# Patient Record
Sex: FEMALE | Race: WHITE
[De-identification: names, ages, dates, MRNs, and addresses within clinical notes are randomized per-mention and may not be internally consistent; named-entity substitution may affect disease eponyms.]

---

## 2017-09-04 ENCOUNTER — HOSPITAL ENCOUNTER (EMERGENCY)
Dept: HOSPITAL 9 - MADERS | Age: 27
Discharge: HOME | End: 2017-09-04
Payer: COMMERCIAL

## 2017-09-04 DIAGNOSIS — F17.210: ICD-10-CM

## 2017-09-04 DIAGNOSIS — F33.9: ICD-10-CM

## 2017-09-04 DIAGNOSIS — N39.0: Primary | ICD-10-CM

## 2017-09-04 LAB
BACTERIA UR QL AUTO: (no result) HPF
PREGU CONTROL BACKGROUND?: (no result)
PREGU CONTROL BAR APPEAR?: YES
PROT UR STRIP.AUTO-MCNC: 100 MG/DL
RBC UR QL AUTO: (no result) HPF (ref 0–3)
SP GR UR STRIP: 1.02 (ref 1–1.03)

## 2017-09-04 PROCEDURE — 96372 THER/PROPH/DIAG INJ SC/IM: CPT

## 2017-09-04 PROCEDURE — 81003 URINALYSIS AUTO W/O SCOPE: CPT

## 2017-09-04 PROCEDURE — 81025 URINE PREGNANCY TEST: CPT

## 2017-09-04 PROCEDURE — 81015 MICROSCOPIC EXAM OF URINE: CPT

## 2017-09-29 ENCOUNTER — HOSPITAL ENCOUNTER (EMERGENCY)
Dept: HOSPITAL 9 - MADERS | Age: 27
Discharge: HOME | End: 2017-09-29
Payer: COMMERCIAL

## 2017-09-29 DIAGNOSIS — J20.9: Primary | ICD-10-CM

## 2017-09-29 DIAGNOSIS — F17.210: ICD-10-CM

## 2017-09-29 DIAGNOSIS — F33.9: ICD-10-CM

## 2017-09-29 PROCEDURE — 71010: CPT

## 2017-09-29 NOTE — RAD
RADIOGRAPH CHEST 1 VIEW:

9/29/17 

 

HISTORY: 

27-year-old female with acute chest pain.

 

FINDINGS:

There are no air space densities, pulmonary edema, pneumothorax, or cardiomegaly.  The lateral costo
phrenic angles are sharp.

 

IMPRESSION:  

No acute cardiopulmonary findings.

 

 

osbaldo []

 

POS: [facility]

## 2017-10-18 ENCOUNTER — HOSPITAL ENCOUNTER (EMERGENCY)
Dept: HOSPITAL 9 - MADERS | Age: 27
Discharge: LEFT BEFORE DISCHARGE | End: 2017-10-18
Payer: COMMERCIAL

## 2017-10-18 DIAGNOSIS — Z53.21: Primary | ICD-10-CM

## 2017-12-04 ENCOUNTER — HOSPITAL ENCOUNTER (EMERGENCY)
Dept: HOSPITAL 9 - MADERS | Age: 27
Discharge: HOME | End: 2017-12-04
Payer: COMMERCIAL

## 2017-12-04 DIAGNOSIS — Z20.818: ICD-10-CM

## 2017-12-04 DIAGNOSIS — Z20.7: ICD-10-CM

## 2017-12-04 DIAGNOSIS — F17.210: ICD-10-CM

## 2017-12-04 DIAGNOSIS — F41.9: ICD-10-CM

## 2017-12-04 DIAGNOSIS — F32.9: ICD-10-CM

## 2017-12-04 DIAGNOSIS — B37.3: Primary | ICD-10-CM

## 2017-12-04 PROCEDURE — 96372 THER/PROPH/DIAG INJ SC/IM: CPT

## 2017-12-04 PROCEDURE — 87491 CHLMYD TRACH DNA AMP PROBE: CPT

## 2017-12-04 PROCEDURE — 87210 SMEAR WET MOUNT SALINE/INK: CPT

## 2017-12-04 PROCEDURE — 87220 TISSUE EXAM FOR FUNGI: CPT

## 2017-12-04 PROCEDURE — 87591 N.GONORRHOEAE DNA AMP PROB: CPT

## 2017-12-06 LAB — C TRACH DNA SPEC QL NAA+PROBE: DETECTED

## 2017-12-14 ENCOUNTER — HOSPITAL ENCOUNTER (EMERGENCY)
Dept: HOSPITAL 9 - MADERS | Age: 27
Discharge: HOME | End: 2017-12-14
Payer: COMMERCIAL

## 2017-12-14 DIAGNOSIS — F41.9: ICD-10-CM

## 2017-12-14 DIAGNOSIS — F17.210: ICD-10-CM

## 2017-12-14 DIAGNOSIS — F32.9: Primary | ICD-10-CM

## 2017-12-14 PROCEDURE — 99283 EMERGENCY DEPT VISIT LOW MDM: CPT

## 2018-01-16 ENCOUNTER — HOSPITAL ENCOUNTER (EMERGENCY)
Dept: HOSPITAL 9 - MADERS | Age: 28
Discharge: HOME | End: 2018-01-16
Payer: COMMERCIAL
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
## 2018-01-16 DIAGNOSIS — S50.812A: Primary | ICD-10-CM

## 2018-01-16 DIAGNOSIS — F17.210: ICD-10-CM

## 2018-01-16 DIAGNOSIS — F43.20: ICD-10-CM

## 2018-01-16 DIAGNOSIS — F33.9: ICD-10-CM

## 2018-01-16 PROCEDURE — 99283 EMERGENCY DEPT VISIT LOW MDM: CPT

## 2018-02-16 ENCOUNTER — HOSPITAL ENCOUNTER (EMERGENCY)
Dept: HOSPITAL 9 - MADERS | Age: 28
Discharge: HOME | End: 2018-02-16
Payer: COMMERCIAL

## 2018-02-16 DIAGNOSIS — T63.301A: Primary | ICD-10-CM

## 2018-02-16 DIAGNOSIS — F41.9: ICD-10-CM

## 2018-02-16 DIAGNOSIS — F17.210: ICD-10-CM

## 2018-02-16 DIAGNOSIS — J01.90: ICD-10-CM

## 2018-02-16 DIAGNOSIS — J20.9: ICD-10-CM

## 2018-02-16 DIAGNOSIS — F32.9: ICD-10-CM

## 2018-02-16 PROCEDURE — 96372 THER/PROPH/DIAG INJ SC/IM: CPT

## 2018-02-18 ENCOUNTER — HOSPITAL ENCOUNTER (EMERGENCY)
Dept: HOSPITAL 9 - MADERS | Age: 28
Discharge: HOME | End: 2018-02-18
Payer: COMMERCIAL

## 2018-02-18 DIAGNOSIS — S60.222A: ICD-10-CM

## 2018-02-18 DIAGNOSIS — F41.1: ICD-10-CM

## 2018-02-18 DIAGNOSIS — F17.210: ICD-10-CM

## 2018-02-18 DIAGNOSIS — T63.301A: Primary | ICD-10-CM
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
## 2018-02-18 PROCEDURE — 96372 THER/PROPH/DIAG INJ SC/IM: CPT
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
## 2018-04-10 ENCOUNTER — HOSPITAL ENCOUNTER (EMERGENCY)
Dept: HOSPITAL 9 - MADERS | Age: 28
Discharge: HOME | End: 2018-04-10
Payer: COMMERCIAL
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
## 2018-04-10 DIAGNOSIS — F41.9: ICD-10-CM

## 2018-04-10 DIAGNOSIS — F32.9: ICD-10-CM

## 2018-04-10 DIAGNOSIS — F17.210: ICD-10-CM

## 2018-04-10 DIAGNOSIS — T14.8XXA: ICD-10-CM

## 2018-04-10 DIAGNOSIS — S50.12XA: Primary | ICD-10-CM

## 2018-04-10 LAB
PREGU CONTROL BACKGROUND?: (no result)
PREGU CONTROL BAR APPEAR?: YES

## 2018-04-10 PROCEDURE — 81025 URINE PREGNANCY TEST: CPT

## 2018-04-10 PROCEDURE — 72125 CT NECK SPINE W/O DYE: CPT

## 2018-04-10 PROCEDURE — 70450 CT HEAD/BRAIN W/O DYE: CPT
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
## 2018-05-17 ENCOUNTER — HOSPITAL ENCOUNTER (EMERGENCY)
Dept: HOSPITAL 9 - MADERS | Age: 28
Discharge: HOME | End: 2018-05-17
Payer: COMMERCIAL

## 2018-05-17 DIAGNOSIS — S50.02XA: Primary | ICD-10-CM

## 2018-05-17 DIAGNOSIS — F17.210: ICD-10-CM
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
## 2018-05-17 DIAGNOSIS — F32.9: ICD-10-CM

## 2018-05-17 NOTE — RAD
FOUR VIEWS LEFT ELBOW:

 

Comparison: None. 

 

History: Left elbow pain. 

 

FINDINGS: 

Four views of the left elbow shows no evidence of acute fracture or dislocation. No elbow effusion is
 seen. No significant degenerative changes are present. 

 

IMPRESSION: 

No evidence of acute osseous abnormality. 

 

POS: [facility]

## 2018-06-27 ENCOUNTER — HOSPITAL ENCOUNTER (EMERGENCY)
Dept: HOSPITAL 9 - MADERS | Age: 28
Discharge: HOME | End: 2018-06-27
Payer: COMMERCIAL

## 2018-06-27 DIAGNOSIS — K02.9: Primary | ICD-10-CM

## 2018-06-27 DIAGNOSIS — F41.9: ICD-10-CM

## 2018-06-27 DIAGNOSIS — F17.210: ICD-10-CM

## 2018-06-27 DIAGNOSIS — F32.9: ICD-10-CM

## 2018-06-27 PROCEDURE — 64400 NJX AA&/STRD TRIGEMINAL NRV: CPT

## 2018-06-27 PROCEDURE — 96372 THER/PROPH/DIAG INJ SC/IM: CPT

## 2018-06-27 PROCEDURE — S0020 INJECTION, BUPIVICAINE HYDRO: HCPCS

## 2018-09-02 ENCOUNTER — HOSPITAL ENCOUNTER (EMERGENCY)
Dept: HOSPITAL 9 - MADERS | Age: 28
Discharge: HOME | End: 2018-09-02
Payer: SELF-PAY

## 2018-09-02 DIAGNOSIS — F41.9: ICD-10-CM

## 2018-09-02 DIAGNOSIS — F32.9: ICD-10-CM

## 2018-09-02 DIAGNOSIS — N39.0: Primary | ICD-10-CM

## 2018-09-02 DIAGNOSIS — F17.210: ICD-10-CM

## 2018-09-02 LAB
BACTERIA UR QL AUTO: (no result) HPF
PREGU CONTROL BACKGROUND?: (no result)
PREGU CONTROL BAR APPEAR?: YES
PROT UR STRIP.AUTO-MCNC: 30 MG/DL
RBC UR QL AUTO: (no result) HPF (ref 0–3)
SP GR UR STRIP: 1.03 (ref 1–1.04)

## 2018-09-02 PROCEDURE — 81025 URINE PREGNANCY TEST: CPT

## 2018-09-02 PROCEDURE — 81003 URINALYSIS AUTO W/O SCOPE: CPT

## 2018-09-02 PROCEDURE — 99283 EMERGENCY DEPT VISIT LOW MDM: CPT

## 2018-09-02 PROCEDURE — 87086 URINE CULTURE/COLONY COUNT: CPT

## 2018-09-02 PROCEDURE — 81015 MICROSCOPIC EXAM OF URINE: CPT

## 2018-09-30 ENCOUNTER — HOSPITAL ENCOUNTER (EMERGENCY)
Dept: HOSPITAL 9 - MADERS | Age: 28
Discharge: HOME | End: 2018-09-30
Payer: SELF-PAY

## 2018-09-30 DIAGNOSIS — F41.9: ICD-10-CM

## 2018-09-30 DIAGNOSIS — F32.9: ICD-10-CM

## 2018-09-30 DIAGNOSIS — R21: Primary | ICD-10-CM

## 2018-09-30 DIAGNOSIS — F17.210: ICD-10-CM

## 2018-09-30 PROCEDURE — 99282 EMERGENCY DEPT VISIT SF MDM: CPT

## 2019-04-26 ENCOUNTER — HOSPITAL ENCOUNTER (EMERGENCY)
Dept: HOSPITAL 9 - MADERS | Age: 29
Discharge: HOME | End: 2019-04-26
Payer: SELF-PAY

## 2019-04-26 DIAGNOSIS — S70.362A: Primary | ICD-10-CM

## 2019-04-26 DIAGNOSIS — F41.9: ICD-10-CM
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
## 2019-04-26 DIAGNOSIS — F31.9: ICD-10-CM

## 2019-04-26 PROCEDURE — 99406 BEHAV CHNG SMOKING 3-10 MIN: CPT
